# Patient Record
(demographics unavailable — no encounter records)

---

## 2025-04-15 NOTE — PHYSICAL EXAM

## 2025-04-15 NOTE — HISTORY OF PRESENT ILLNESS
[FreeTextEntry1] : 89 yo male, hx of HTN, bph, high cholesterol referred for fecal incontinence which followed a fall and lumbar fx 1/25. NO urinary incontinence. NO rectal bleeding Has mixed solid and diarrhea.Last colonoscopy 3 years ago with Dr. UBALDO Shah

## 2025-04-15 NOTE — ASSESSMENT
[FreeTextEntry1] : 89 yo male, hx of HTN, bph, high cholesterol referred for fecal incontinence which followed a fall and lumbar fx 1/25. NO urinary incontinence. NO rectal bleeding Has mixed solid and diarrhea.Last colonoscopy [address] UBALDO Shah IMP: fecal incontinence- r/o c.diff doubt colitis BPH htn PLAN: 1. calprotectin, elastase, c.diff 2. If above neg, will consider colonoscopy